# Patient Record
Sex: MALE | Race: WHITE
[De-identification: names, ages, dates, MRNs, and addresses within clinical notes are randomized per-mention and may not be internally consistent; named-entity substitution may affect disease eponyms.]

---

## 2018-05-15 ENCOUNTER — HOSPITAL ENCOUNTER (EMERGENCY)
Dept: HOSPITAL 45 - C.EDB | Age: 43
Discharge: HOME | End: 2018-05-15
Payer: COMMERCIAL

## 2018-05-15 VITALS
HEIGHT: 70.98 IN | BODY MASS INDEX: 23.58 KG/M2 | HEIGHT: 70.98 IN | BODY MASS INDEX: 23.58 KG/M2 | WEIGHT: 168.43 LBS | WEIGHT: 168.43 LBS

## 2018-05-15 VITALS — TEMPERATURE: 97.7 F

## 2018-05-15 VITALS — OXYGEN SATURATION: 96 % | HEART RATE: 102 BPM

## 2018-05-15 VITALS — SYSTOLIC BLOOD PRESSURE: 135 MMHG | DIASTOLIC BLOOD PRESSURE: 97 MMHG

## 2018-05-15 DIAGNOSIS — F17.200: ICD-10-CM

## 2018-05-15 DIAGNOSIS — K85.90: Primary | ICD-10-CM

## 2018-05-15 DIAGNOSIS — Z84.1: ICD-10-CM

## 2018-05-15 LAB
ALBUMIN SERPL-MCNC: 4.4 GM/DL (ref 3.4–5)
ALP SERPL-CCNC: 72 U/L (ref 45–117)
ALT SERPL-CCNC: 49 U/L (ref 12–78)
AST SERPL-CCNC: 42 U/L (ref 15–37)
BASOPHILS # BLD: 0.01 K/UL (ref 0–0.2)
BASOPHILS NFR BLD: 0.1 %
BUN SERPL-MCNC: 8 MG/DL (ref 7–18)
CALCIUM SERPL-MCNC: 9.1 MG/DL (ref 8.5–10.1)
CO2 SERPL-SCNC: 26 MMOL/L (ref 21–32)
CREAT SERPL-MCNC: 0.81 MG/DL (ref 0.6–1.4)
EOS ABS #: 0 K/UL (ref 0–0.5)
EOSINOPHIL NFR BLD AUTO: 214 K/UL (ref 130–400)
GLUCOSE SERPL-MCNC: 122 MG/DL (ref 70–99)
HCT VFR BLD CALC: 47.2 % (ref 42–52)
HGB BLD-MCNC: 17.3 G/DL (ref 14–18)
IG#: 0.02 K/UL (ref 0–0.02)
IMM GRANULOCYTES NFR BLD AUTO: 6.7 %
LIPASE: 1960 U/L (ref 73–393)
LYMPHOCYTES # BLD: 0.76 K/UL (ref 1.2–3.4)
MCH RBC QN AUTO: 38.3 PG (ref 25–34)
MCHC RBC AUTO-ENTMCNC: 36.7 G/DL (ref 32–36)
MCV RBC AUTO: 104.4 FL (ref 80–100)
MONO ABS #: 0.63 K/UL (ref 0.11–0.59)
MONOCYTES NFR BLD: 5.6 %
NEUT ABS #: 9.85 K/UL (ref 1.4–6.5)
NEUTROPHILS # BLD AUTO: 0 %
NEUTROPHILS NFR BLD AUTO: 87.4 %
PMV BLD AUTO: 10 FL (ref 7.4–10.4)
POTASSIUM SERPL-SCNC: 4 MMOL/L (ref 3.5–5.1)
PROT SERPL-MCNC: 8.3 GM/DL (ref 6.4–8.2)
RED CELL DISTRIBUTION WIDTH CV: 13.5 % (ref 11.5–14.5)
RED CELL DISTRIBUTION WIDTH SD: 52.6 FL (ref 36.4–46.3)
SODIUM SERPL-SCNC: 140 MMOL/L (ref 136–145)
WBC # BLD AUTO: 11.27 K/UL (ref 4.8–10.8)

## 2018-05-15 NOTE — EMERGENCY ROOM VISIT NOTE
History


First contact with patient:  18:42


Chief Complaint:  GI ASSESSMENT


Stated Complaint:  VOMITING,DIARRHEA,ABD PAIN,CHILLS


Nursing Triage Summary:  


abd pain started yesterday, progressively worse, diffuse, starting to radiate 

to 


flanks bilaterally, nausesa, vomiting, some diarrhea





History of Present Illness


The patient is a 42 year old male who presents to the Emergency Room via 

private vehicle accompanied by female with complaints of "vomiting, diarrhea, 

abdominal pain, chills".  The patient states that he ate [employer] yesterday 

and had no problems and last night began to feel nauseous followed by vomiting.

  It is now worse and he has garland-umbilical cramping today.  He notes diffuse 

abdominal pain.  He is vomited numerous times.  No blood in the vomit.  He 

feels dehydrated.  His last bowel movement was normal.  He notes alcohol use 

regularly.  He tried 8 milligrams of Zofran prior to arrival with some relief.





Review of Systems


A complete 10-point Review of Systems was discussed with the patient, with 

pertinent positives and negatives listed in the History of Present Illness. All 

remaining Review of Systems questions can be considered negative unless 

otherwise specified.





Past Medical/Surgical History


Medical Problems:


(1) No known health problems








Family History





Kidney disease


Kidney stones





Social History


Smoking Status:  Current Every Day Smoker


Alcohol Use:  occasionally


Drug Use:  marijuana


Occupation Status:  employed





Current/Historical Medications


Scheduled


Ondasetron Odt (Zofran Odt), 4 MG SL Q6H





Scheduled PRN


Acetaminophen (Tylenol Arthritis Ext Rel), 1,300 MG PO Q8H PRN for Pain


Ondansetron Hcl (Zofran), 8 MG PO Q8 PRN for Nausea


Oxycodone Ir (Roxicodone Ir), 1-2 TAB PO Q4H PRN for Pain





Physical Exam


Vital Signs











  Date Time  Temp Pulse Resp B/P (MAP) Pulse Ox O2 Delivery O2 Flow Rate FiO2


 


5/15/18 23:10  94      


 


5/15/18 22:36  102 17  93   


 


5/15/18 22:31    125/86    


 


5/15/18 22:06  99 19  95   


 


5/15/18 22:00    140/92    


 


5/15/18 21:36  93 18  96   


 


5/15/18 21:31    135/90    


 


5/15/18 21:15  97 18  95 Room Air  


 


5/15/18 21:01    135/96    


 


5/15/18 20:45  100 14  94   


 


5/15/18 20:30    129/96    


 


5/15/18 20:15  91 18  94   


 


5/15/18 20:10  89 17  94   


 


5/15/18 20:01    131/92    


 


5/15/18 19:40  89 18  97   


 


5/15/18 19:35  98 17  100   


 


5/15/18 19:31    124/91    


 


5/15/18 19:13    128/85    


 


5/15/18 19:05  85 24  97 Room Air  


 


5/15/18 18:38 36.5 92 20 123/74 98 Room Air  











Physical Exam


VITAL SIGNS - Vital signs and nursing notes were reviewed.  Stable.


GENERAL -42-year-old male appearing his stated age who is in no acute distress. 

Communicates well with provider and answers questions appropriately.


SKIN - Without rashes.  No meningeal or petechial rash.


HEAD - NC/AT.


EYES - PERRL with EOMI bilaterally. Sclera anicteric.


EARS - No deformities of external structures noted on gross examination 

bilaterally. 


NOSE - Midline and without cyanosis. No epistaxis or purulent drainage noted.


MOUTH/OROPHARYNX - Without perioral cyanosis. 


LUNGS - Chest wall symmetric without accessory muscle use, intercostals 

retractions, or central cyanosis. Normal vesicular breath sounds CTA B/L. No 

wheezes, rales, or rhonchi appreciated.


CARDIAC - RRR with S1/S2. No murmur, rubs, or gallops appreciated. 


ABDOMEN - Abdominal contour normal without pulsations or visible masses. BS 

normoactive all four quadrants.  Generalized abdominal tenderness noted.  No 

palpable masses, hepatosplenomegaly, or ascites noted.





Medical Decision & Procedures


ER Provider


Diagnostic Interpretation:


ABDOMEN AND PELVIS CT WITH IV AND ORAL CONTRAST





CT DOSE: 280.95 mGy.cm





HISTORY: Acute periumbilical abdominal pain periumbilical abd pain, nausea,


emesis





TECHNIQUE: Multiaxial CT images of the abdomen and pelvis were performed


following the use of intravenous and oral contrast.  A dose lowering technique


was utilized adhering to the principles of ALARA.





COMPARISON STUDY: None.





FINDINGS:


Mild subsegmental bibasilar atelectasis. Mild emphysematous changes of the lung


bases. There is no pneumatosis or pneumoperitoneum identified. Inferior cardiac


chambers are unremarkable.





Hepatic steatosis. No intrahepatic biliary ductal dilation. The gallbladder,


spleen and adrenal glands are within normal limits. There is mild interstitial


edema about the pancreas with mild peripancreatic edema and inflammatory


stranding also noted. No focal peripancreatic fluid collections, focal


pancreatic mass or ductal dilation. Patent splenic, superior mesenteric and


portal veins.





Kidneys, ureters and bladder are unremarkable. Minimal atherosclerosis of the


iliac vasculature and aortoiliac bifurcation. IVC is unremarkable. No bulky


adenopathy identified.





Mild wall thickening of the gastric antrum. Nondistention of the duodenum. No


bowel obstruction. Mild free fluid tracks along the left pericolic gutter,


likely from the pancreas. The appendix appears normal. Soft tissues and bones


appear unremarkable.





IMPRESSION: 





1. Mild interstitial edema about the pancreas with mild peripancreatic edema and


inflammatory stranding suggests acute uncomplicated pancreatitis. No


peripancreatic fluid collection identified. Correlate with lipase level.


2. Mild wall thickening of the gastric antrum is likely reactive secondary to


acute pancreatitis with associated gastritis thought to be less likely.


3. No bowel obstruction. Normal appendix.


4. Emphysema.











Electronically signed by:  Cisco Eden M.D.


5/15/2018 10:15 PM





Dictated Date/Time:  5/15/2018 10:08 PM





Laboratory Results


5/15/18 19:10








Red Blood Count 4.52, Mean Corpuscular Volume 104.4, Mean Corpuscular 

Hemoglobin 38.3, Mean Corpuscular Hemoglobin Concent 36.7, Mean Platelet Volume 

10.0, Neutrophils (%) (Auto) 87.4, Lymphocytes (%) (Auto) 6.7, Monocytes (%) (

Auto) 5.6, Eosinophils (%) (Auto) 0.0, Basophils (%) (Auto) 0.1, Neutrophils # (

Auto) 9.85, Lymphocytes # (Auto) 0.76, Monocytes # (Auto) 0.63, Eosinophils # (

Auto) 0.00, Basophils # (Auto) 0.01





5/15/18 19:10

















Test


  5/15/18


18:45 5/15/18


19:10


 


Urine Color DK YELLOW  


 


Urine Appearance CLEAR (CLEAR)  


 


Urine pH 7.0 (4.5-7.5)  


 


Urine Specific Gravity


  1.026


(1.000-1.030) 


 


 


Urine Protein 1+ (NEG)  


 


Urine Glucose (UA) NEG (NEG)  


 


Urine Ketones TRACE (NEG)  


 


Urine Occult Blood NEG (NEG)  


 


Urine Nitrite NEG (NEG)  


 


Urine Bilirubin NEG (NEG)  


 


Urine Urobilinogen NEG (NEG)  


 


Urine Leukocyte Esterase SMALL (NEG)  


 


Urine WBC (Auto)


  5-10 /hpf


(0-5) 


 


 


Urine RBC (Auto) 0-4 /hpf (0-4)  


 


Urine Hyaline Casts (Auto) 1-5 /lpf (0-5)  


 


Urine Epithelial Cells (Auto)


  10-20 /lpf


(0-5) 


 


 


Urine Bacteria (Auto) NEG (NEG)  


 


White Blood Count


  


  11.27 K/uL


(4.8-10.8)


 


Red Blood Count


  


  4.52 M/uL


(4.7-6.1)


 


Hemoglobin


  


  17.3 g/dL


(14.0-18.0)


 


Hematocrit  47.2 % (42-52) 


 


Mean Corpuscular Volume


  


  104.4 fL


()


 


Mean Corpuscular Hemoglobin


  


  38.3 pg


(25-34)


 


Mean Corpuscular Hemoglobin


Concent 


  36.7 g/dl


(32-36)


 


Platelet Count


  


  214 K/uL


(130-400)


 


Mean Platelet Volume


  


  10.0 fL


(7.4-10.4)


 


Neutrophils (%) (Auto)  87.4 % 


 


Lymphocytes (%) (Auto)  6.7 % 


 


Monocytes (%) (Auto)  5.6 % 


 


Eosinophils (%) (Auto)  0.0 % 


 


Basophils (%) (Auto)  0.1 % 


 


Neutrophils # (Auto)


  


  9.85 K/uL


(1.4-6.5)


 


Lymphocytes # (Auto)


  


  0.76 K/uL


(1.2-3.4)


 


Monocytes # (Auto)


  


  0.63 K/uL


(0.11-0.59)


 


Eosinophils # (Auto)


  


  0.00 K/uL


(0-0.5)


 


Basophils # (Auto)


  


  0.01 K/uL


(0-0.2)


 


RDW Standard Deviation


  


  52.6 fL


(36.4-46.3)


 


RDW Coefficient of Variation


  


  13.5 %


(11.5-14.5)


 


Immature Granulocyte % (Auto)  0.2 % 


 


Immature Granulocyte # (Auto)


  


  0.02 K/uL


(0.00-0.02)


 


Anion Gap


  


  10.0 mmol/L


(3-11)


 


Est Creatinine Clear Calc


Drug Dose 


  127.8 ml/min 


 


 


Estimated GFR (


American) 


  127.9 


 


 


Estimated GFR (Non-


American 


  110.4 


 


 


BUN/Creatinine Ratio  9.8 (10-20) 


 


Calcium Level


  


  9.1 mg/dl


(8.5-10.1)


 


Magnesium Level


  


  1.8 mg/dl


(1.8-2.4)


 


Total Bilirubin


  


  0.3 mg/dl


(0.2-1)


 


Aspartate Amino Transf


(AST/SGOT) 


  42 U/L (15-37) 


 


 


Alanine Aminotransferase


(ALT/SGPT) 


  49 U/L (12-78) 


 


 


Alkaline Phosphatase


  


  72 U/L


()


 


Total Protein


  


  8.3 gm/dl


(6.4-8.2)


 


Albumin


  


  4.4 gm/dl


(3.4-5.0)


 


Globulin


  


  3.9 gm/dl


(2.5-4.0)


 


Albumin/Globulin Ratio  1.1 (0.9-2) 


 


Lipase


  


  1960 U/L


()











Medications Administered











 Medications


  (Trade)  Dose


 Ordered  Sig/Kvng


 Route  Start Time


 Stop Time Status Last Admin


Dose Admin


 


 Sodium Chloride  1,000 ml @ 


 999 mls/hr  Q1H1M STAT


 IV  5/15/18 19:03


 5/15/18 20:03 DC 5/15/18 19:13


999 MLS/HR


 


 Hydromorphone HCl


  (Dilaudid Inj)  1 mg  NOW  STAT


 IV  5/15/18 19:22


 5/15/18 19:24 DC 5/15/18 19:29


1 MG


 


 Promethazine HCl


 12.5 mg/Sodium


 Chloride  50.5 ml @ 


 204 mls/hr  NOW  STAT


 IV  5/15/18 19:22


 5/15/18 19:36 DC 5/15/18 19:52


204 MLS/HR


 


 Hydromorphone HCl


  (Dilaudid Inj)  1 mg  NOW  STAT


 IV  5/15/18 20:26


 5/15/18 20:27 DC 5/15/18 20:31


1 MG











Medical Decision


Patient was seen and evaluated as above in room be 11. Review was performed of 

nursing notes and vital signs. After obtaining a thorough history and physical 

examination the above work up was performed.  He presents to us today with 

vomiting.  He has abdominal pain.  He is nontoxic on exam.  Secondary to his 

presentation I did elect to obtain baseline labs.  I also elected for a CT scan 

of the patient's abdomen and pelvis also secondary to his presentation.  He was 

given Dilaudid 2 mg total, fluids and Phenergan.  Phenergan was chosen as he 

already had Zofran prior to arrival.  His labs do reveal leukocytosis of 11.27.

  No significant anemia noted.  Chemistry panel reveals no evidence of kidney 

failure.  No evidence of liver failure.  His lipase and AST are elevated.  AST 

is only slightly.  Lipase is over 1900.  Protein slightly low at 8.3.  His 

urine does reveal trace ketones, a small amount leukocytes, white blood cells 

and epithelial cells.  I do not suspect UTI.  CAT scan correlates with his 

lipase showing pancreatitis.  This is acute uncomplicated.  I highly 

recommended to the patient that he stays in the hospital for management.  He 

was educated upon risks yet still would like to be discharged.  I will honor 

his request.  He was educated upon management.  He was also educated upon 

worrisome symptoms which to return.  He is to be on clear liquid diet 3 days 

and slowly progress his diet.  Alcohol cessation discussed.  He declined any 

help with his alcohol cessation.  He will be given oxycodone and Zofran.  

[place] drug monitoring system does not reveal any red flags.  He is to 

call the family doctor first thing tomorrow to schedule follow-up.  He was 

certainly invited back to the ER with any change of symptoms.  The patient was 

educated upon management, had questions answered prior to discharge, and was 

discharged home in good condition.





Case was discussed with the attending physician.





In the evaluation and treatment of this patient the following differential 

diagnoses were entertained: Pancreatitis, acute cholecystitis, ascending 

cholangitis, gastritis, appendicitis, diverticulitis, abscess, perforation, 

constipation, GI virus, among others.





Impression





 Primary Impression:  


 Pancreatitis





Departure Information


Dispostion


Home / Self-Care





Condition


GOOD





Prescriptions





Oxycodone Ir (Roxicodone Ir) 5 Mg Tab


1-2 TAB PO Q4H Y for Pain, #20 TAB


   For Initial Treatment


   Prov: Sharath Obrien PA-C         5/15/18 


Ondasetron Odt (ZOFRAN ODT) 4 Mg Tab


4 MG SL Q6H for Nausea, #12 TAB


   Prov: Sharath Obrien PA-C         5/15/18





Referrals


Brad Bowman M.D.([facility]) (PCP)





Patient Instructions


ED Pancreatitis, [facility], Pancreatitis Acute Dc





Additional Instructions





You have been treated in the Emergency Department your Abdominal Pain.





You have pancreatitis based upon your labs and CAT scan.  It is recommended to 

stay in the hospital for this.  Per request we will respect your wish to go 

home.





I recommend a clear liquid diet for the next 3 days.





Then progress to a bland diet.  Such as bananas, rice, applesauce toast.





Then advance your diet as tolerated.





You have been prescribed Oxy IR to be used for pain control. This is a narcotic 

medication. You cannot drive or consume alcohol while on this medicine. This 

medicine should only be used for pain that cannot be controlled with over-the-

counter pain medicines.





You have been prescribed ZOFRAN to be used for any nausea or vomiting. Take as 

prescribed.





For pain control, you can use the following over-the-counter medicines (if >13 yo):





- Regular strength (325mg/tab) Tylenol (acetaminophen) 2 tabs every 4-6 hours 

as needed. Do not exceed 12 tablets in a 24 hour period. Avoid taking more than 

3 grams (3000 mg) of Tylenol per day. This includes any other sources of 

acetaminophen you may take on a regular basis.





- Regular strength (200 mg/tab) Advil (ibuprofen) 1-2 tabs every 4-6 hours as 

needed. Do not exceed a dose of 3200 mg per day.





Drink plenty of water and stay well hydrated. 





As with any trip to the Emergency Department, you should follow-up with your 

Primary Care Provider from today's visit.  Ideally follow-up should occur on 

Friday to recheck your labs, and your symptoms.





Return to the emergency department if your symptoms persist despite treatment 

plan outlined above or if the following symptoms occur: increased fevers, chills

, worsening nausea/vomiting, blood in your stool or urine, inability to keep 

down medications or worsening.





Please return with any new/concerning symptoms.

## 2018-05-15 NOTE — DIAGNOSTIC IMAGING REPORT
ABDOMEN AND PELVIS CT WITH IV AND ORAL CONTRAST



CT DOSE: 280.95 mGy.cm



HISTORY: Acute periumbilical abdominal pain periumbilical abd pain, nausea,

emesis



TECHNIQUE: Multiaxial CT images of the abdomen and pelvis were performed

following the use of intravenous and oral contrast.  A dose lowering technique

was utilized adhering to the principles of ALARA.



COMPARISON STUDY: None.



FINDINGS:

Mild subsegmental bibasilar atelectasis. Mild emphysematous changes of the lung

bases. There is no pneumatosis or pneumoperitoneum identified. Inferior cardiac

chambers are unremarkable.



Hepatic steatosis. No intrahepatic biliary ductal dilation. The gallbladder,

spleen and adrenal glands are within normal limits. There is mild interstitial

edema about the pancreas with mild peripancreatic edema and inflammatory

stranding also noted. No focal peripancreatic fluid collections, focal

pancreatic mass or ductal dilation. Patent splenic, superior mesenteric and

portal veins.



Kidneys, ureters and bladder are unremarkable. Minimal atherosclerosis of the

iliac vasculature and aortoiliac bifurcation. IVC is unremarkable. No bulky

adenopathy identified.



Mild wall thickening of the gastric antrum. Nondistention of the duodenum. No

bowel obstruction. Mild free fluid tracks along the left pericolic gutter,

likely from the pancreas. The appendix appears normal. Soft tissues and bones

appear unremarkable.



IMPRESSION: 



1. Mild interstitial edema about the pancreas with mild peripancreatic edema and

inflammatory stranding suggests acute uncomplicated pancreatitis. No

peripancreatic fluid collection identified. Correlate with lipase level.

2. Mild wall thickening of the gastric antrum is likely reactive secondary to

acute pancreatitis with associated gastritis thought to be less likely.

3. No bowel obstruction. Normal appendix.

4. Emphysema.







Electronically signed by:  Cisco Eden M.D.

5/15/2018 10:15 PM



Dictated Date/Time:  5/15/2018 10:08 PM